# Patient Record
Sex: FEMALE | Race: BLACK OR AFRICAN AMERICAN | NOT HISPANIC OR LATINO | Employment: FULL TIME | URBAN - METROPOLITAN AREA
[De-identification: names, ages, dates, MRNs, and addresses within clinical notes are randomized per-mention and may not be internally consistent; named-entity substitution may affect disease eponyms.]

---

## 2022-03-09 ENCOUNTER — TELEPHONE (OUTPATIENT)
Dept: SLEEP MEDICINE | Facility: CLINIC | Age: 62
End: 2022-03-09
Payer: COMMERCIAL

## 2022-03-10 ENCOUNTER — OFFICE VISIT (OUTPATIENT)
Dept: SLEEP MEDICINE | Facility: CLINIC | Age: 62
End: 2022-03-10
Payer: COMMERCIAL

## 2022-03-10 VITALS
BODY MASS INDEX: 35.56 KG/M2 | SYSTOLIC BLOOD PRESSURE: 146 MMHG | DIASTOLIC BLOOD PRESSURE: 81 MMHG | HEART RATE: 58 BPM | WEIGHT: 227.06 LBS

## 2022-03-10 DIAGNOSIS — F51.09 OTHER INSOMNIA NOT DUE TO A SUBSTANCE OR KNOWN PHYSIOLOGICAL CONDITION: ICD-10-CM

## 2022-03-10 DIAGNOSIS — R35.1 NOCTURIA: ICD-10-CM

## 2022-03-10 DIAGNOSIS — G47.33 OSA (OBSTRUCTIVE SLEEP APNEA): Primary | ICD-10-CM

## 2022-03-10 DIAGNOSIS — R53.83 FATIGUE, UNSPECIFIED TYPE: ICD-10-CM

## 2022-03-10 PROCEDURE — 3079F PR MOST RECENT DIASTOLIC BLOOD PRESSURE 80-89 MM HG: ICD-10-PCS | Mod: CPTII,S$GLB,, | Performed by: INTERNAL MEDICINE

## 2022-03-10 PROCEDURE — 1159F PR MEDICATION LIST DOCUMENTED IN MEDICAL RECORD: ICD-10-PCS | Mod: CPTII,S$GLB,, | Performed by: INTERNAL MEDICINE

## 2022-03-10 PROCEDURE — 3079F DIAST BP 80-89 MM HG: CPT | Mod: CPTII,S$GLB,, | Performed by: INTERNAL MEDICINE

## 2022-03-10 PROCEDURE — 3077F PR MOST RECENT SYSTOLIC BLOOD PRESSURE >= 140 MM HG: ICD-10-PCS | Mod: CPTII,S$GLB,, | Performed by: INTERNAL MEDICINE

## 2022-03-10 PROCEDURE — 99204 PR OFFICE/OUTPT VISIT, NEW, LEVL IV, 45-59 MIN: ICD-10-PCS | Mod: S$GLB,,, | Performed by: INTERNAL MEDICINE

## 2022-03-10 PROCEDURE — 1159F MED LIST DOCD IN RCRD: CPT | Mod: CPTII,S$GLB,, | Performed by: INTERNAL MEDICINE

## 2022-03-10 PROCEDURE — 4010F PR ACE/ARB THEARPY RXD/TAKEN: ICD-10-PCS | Mod: CPTII,S$GLB,, | Performed by: INTERNAL MEDICINE

## 2022-03-10 PROCEDURE — 99999 PR PBB SHADOW E&M-EST. PATIENT-LVL III: ICD-10-PCS | Mod: PBBFAC,,, | Performed by: INTERNAL MEDICINE

## 2022-03-10 PROCEDURE — 3077F SYST BP >= 140 MM HG: CPT | Mod: CPTII,S$GLB,, | Performed by: INTERNAL MEDICINE

## 2022-03-10 PROCEDURE — 3008F PR BODY MASS INDEX (BMI) DOCUMENTED: ICD-10-PCS | Mod: CPTII,S$GLB,, | Performed by: INTERNAL MEDICINE

## 2022-03-10 PROCEDURE — 4010F ACE/ARB THERAPY RXD/TAKEN: CPT | Mod: CPTII,S$GLB,, | Performed by: INTERNAL MEDICINE

## 2022-03-10 PROCEDURE — 99204 OFFICE O/P NEW MOD 45 MIN: CPT | Mod: S$GLB,,, | Performed by: INTERNAL MEDICINE

## 2022-03-10 PROCEDURE — 99999 PR PBB SHADOW E&M-EST. PATIENT-LVL III: CPT | Mod: PBBFAC,,, | Performed by: INTERNAL MEDICINE

## 2022-03-10 PROCEDURE — 3008F BODY MASS INDEX DOCD: CPT | Mod: CPTII,S$GLB,, | Performed by: INTERNAL MEDICINE

## 2022-03-10 RX ORDER — LOSARTAN POTASSIUM 100 MG/1
100 TABLET ORAL DAILY
COMMUNITY

## 2022-03-10 RX ORDER — FERROUS SULFATE, DRIED 160(50) MG
1 TABLET, EXTENDED RELEASE ORAL 2 TIMES DAILY WITH MEALS
COMMUNITY

## 2022-03-10 NOTE — PROGRESS NOTES
Referred by Medicine, [facility]*     NEW PATIENT VISIT    Meme Flores  is a pleasant 61 y.o. female  with PMH significant for severe FANNY (dx circa 2019, did not receive CPAP, had wt loss surgery), elevated BMI (330#), s/p gastric bypass (#210) who presents today for repeat evaluation after weight loss.      SLEEP SCHEDULE   Bed Time varies   Sleep Latency variable   Arousals 3-4   Nocturia 3-4   Back to sleep 1+ hours   Wake time 6AM   Naps none   Work Retired, , works at a rehab center     No flowsheet data found.  No flowsheet data found.    Vitals:    03/10/22 0906   BP: (!) 146/81   BP Location: Right arm   Patient Position: Sitting   BP Method: Medium (Automatic)   Pulse: (!) 58   Weight: 103 kg (227 lb 1.2 oz)     Physical Exam:    GEN:   Well-appearing  Psych:  Appropriate affect, demonstrates insight  SKIN:  No rash on the face or bridge of the nose    LABS:   No results found for: HGB, CO2    RECORDS REVIEWED PREVIOUSLY:    Prior sleep studies are unavailable.    ASSESSMENT    No flowsheet data found.  PROBLEM DESCRIPTION/ Sx on Presentation  STATUS   Hx severe FANNY   Treated with bariatric surgery (2019)  #100 wt loss  + snoring, + snoring arousals  HEENT: MP4, + oropharynx shallow in A-P dimension and + crowded airway  New   Daytime Sx   occasional sleepiness when inactive   denies sleepiness when driving   ESS 7/24 on intake  New   Nocturia   3-4 times per night  New   [place]   Waking frequently  Sometimes hard to get back to sleep  Feels she is a much lighter sleeper than previously  New   Other issues:     PLAN     -will proceed with sleep testing (HST to reassess FANNY after wt loss)  -discussed trial of PAP therapy if FANNY present   -driving precautions were discussed with the patient    RTC          The patient was given open opportunity to ask questions and/or express concerns about treatment plan.   All questions/concerns were discussed.     Two patient identifiers used  prior to evaluation.

## 2022-03-11 ENCOUNTER — TELEPHONE (OUTPATIENT)
Dept: SLEEP MEDICINE | Facility: OTHER | Age: 62
End: 2022-03-11
Payer: COMMERCIAL

## 2022-03-15 ENCOUNTER — TELEPHONE (OUTPATIENT)
Dept: SLEEP MEDICINE | Facility: OTHER | Age: 62
End: 2022-03-15
Payer: COMMERCIAL

## 2022-03-16 ENCOUNTER — HOSPITAL ENCOUNTER (OUTPATIENT)
Dept: SLEEP MEDICINE | Facility: OTHER | Age: 62
Discharge: HOME | End: 2022-03-16
Attending: INTERNAL MEDICINE
Payer: COMMERCIAL

## 2022-03-16 DIAGNOSIS — G47.33 OSA (OBSTRUCTIVE SLEEP APNEA): ICD-10-CM

## 2022-03-16 DIAGNOSIS — R53.83 FATIGUE, UNSPECIFIED TYPE: ICD-10-CM

## 2022-03-16 DIAGNOSIS — R35.1 NOCTURIA: ICD-10-CM

## 2022-03-16 DIAGNOSIS — F51.09 OTHER INSOMNIA NOT DUE TO A SUBSTANCE OR KNOWN PHYSIOLOGICAL CONDITION: ICD-10-CM

## 2022-03-16 PROCEDURE — 95800 SLP STDY UNATTENDED: CPT

## 2022-03-16 PROCEDURE — 95806 PR SLEEP STUDY, UNATTENDED, SIMUL RECORD HR/O2 SAT/RESP FLOW/RESP EFFT: ICD-10-PCS | Mod: 26,,, | Performed by: INTERNAL MEDICINE

## 2022-03-16 PROCEDURE — 95806 SLEEP STUDY UNATT&RESP EFFT: CPT | Mod: 26,,, | Performed by: INTERNAL MEDICINE

## 2022-03-25 ENCOUNTER — TELEPHONE (OUTPATIENT)
Dept: SLEEP MEDICINE | Facility: CLINIC | Age: 62
End: 2022-03-25
Payer: COMMERCIAL

## 2022-03-25 NOTE — TELEPHONE ENCOUNTER
----- Message from Caitlyn Hensley MA sent at 3/25/2022 11:17 AM CDT -----  Regarding: pt requesting a call back  Name of Who is Calling: OLIVIA GRIDER [[id number]]           What is the request in detail: pt requesting a call back in regards to sleep study results            Can the clinic reply by [facility]: n           [facility] [phone]

## 2022-04-21 ENCOUNTER — TELEPHONE (OUTPATIENT)
Dept: SLEEP MEDICINE | Facility: CLINIC | Age: 62
End: 2022-04-21
Payer: COMMERCIAL

## 2022-04-21 NOTE — TELEPHONE ENCOUNTER
Staff left a message informing the pt to call the office back about her results of the sleep study

## 2022-08-13 ENCOUNTER — NURSE TRIAGE (OUTPATIENT)
Dept: ADMINISTRATIVE | Facility: CLINIC | Age: 62
End: 2022-08-13
Payer: COMMERCIAL

## 2022-08-13 NOTE — TELEPHONE ENCOUNTER
Patient called message left on , time two.     Reason for Disposition   Message left on identified voice mail    Protocols used: NO CONTACT OR DUPLICATE CONTACT CALL-A-AH

## 2025-03-27 ENCOUNTER — OFFICE VISIT (OUTPATIENT)
Dept: OTOLARYNGOLOGY | Facility: CLINIC | Age: 65
End: 2025-03-27
Payer: COMMERCIAL

## 2025-03-27 DIAGNOSIS — R49.0 DYSPHONIA: ICD-10-CM

## 2025-03-27 DIAGNOSIS — J31.0 CHRONIC RHINITIS: Primary | ICD-10-CM

## 2025-03-27 PROCEDURE — 99999 PR PBB SHADOW E&M-EST. PATIENT-LVL III: CPT | Mod: PBBFAC,,, | Performed by: OTOLARYNGOLOGY

## 2025-03-27 RX ORDER — CETIRIZINE HYDROCHLORIDE 10 MG/1
10 TABLET ORAL DAILY
Qty: 30 TABLET | Refills: 2 | Status: SHIPPED | OUTPATIENT
Start: 2025-03-27 | End: 2025-04-26

## 2025-03-27 RX ORDER — FLUTICASONE PROPIONATE 50 MCG
2 SPRAY, SUSPENSION (ML) NASAL DAILY
Qty: 16 G | Refills: 2 | Status: SHIPPED | OUTPATIENT
Start: 2025-03-27 | End: 2025-06-25

## 2025-03-27 NOTE — PROGRESS NOTES
Ear, Nose, & Throat  Otolaryngology - Head & Neck Surgery    Summary of Visit:  Meme Flores was  seen in ENT Clinic  for hoarseness    Subjective:     Chief Complaint:   Chief Complaint   Patient presents with    Hoarse       Meme Flores is a 64 y.o. female who was  seen in ENT Clinic  for dysphonia.  For the last several weeks, she has experienced dysphonia which varies throughout the day.  She notes that it typically worsens as the day proceeds.  She does report significant allergic rhinitis symptoms with some nasal congestion and sensation of postnasal drainage.  She has been on fluticasone with some improvement in these symptoms.  She denies any dysphagia, chronic cough, aspiration symptoms.    Past Medical History  Active Ambulatory Problems     Diagnosis Date Noted    FANNY (obstructive sleep apnea)      Resolved Ambulatory Problems     Diagnosis Date Noted    No Resolved Ambulatory Problems     Past Medical History:   Diagnosis Date    Hypertension        Past Surgical History  She has a past surgical history that includes Hysterectomy.    Past Surgical History:   Procedure Laterality Date    HYSTERECTOMY          Family History  Her family history is not on file.    Social History  She reports that she has never smoked. She has never used smokeless tobacco. She reports that she does not drink alcohol and does not use drugs.    Allergies  She has no known allergies.    Medications  She has a current medication list which includes the following prescription(s): aspirin, calcium-vitamin d3, cetirizine, clonazepam, fluticasone propionate, lisinopril, losartan, multivitamin, and rosuvastatin.    ROS:  Pertinent positive and negative review of systems as noted in HPI.     Objective:     There were no vitals taken for this visit.   General Appearance:   Awake, Alert and Oriented. NAD. Appropriate affect and appearance      Neuro:   Spontaneous eye opening, appropriate verbal responses, follows  commands  Pupils equal, round & brisk. EOMI, no proptosis  Face is symmetric, HB I, non-edematous bilaterally  Vision grossly intact, Hearing grossly intact     Head and Face:   skin is intact with no lesions noted.  Parotid and submandibular glands are symmetric and non-tender.      Ears:  Periauricular regions non-erythematous, non-fluctuanct non-tender  Pinna normal bilaterally, no skin lesions  EACs patent and without drainage bilaterally   Tympanic membranes are normal in appearance bilaterally.  No middle ear effusion noted bilaterally.    Nose:   External nose is symmetric, no skin lesions  Septum midline, No inferior turbinate hypertrophy, No polyps or rhinorrhea     OC/OP:  Tongue midline on extension, non-edematous, soft  No labial, buccal, oral tongue or floor of mouth lesions  Soft palate symmetric, midline and without lesions or masses, tonsils symmetric  No masses or lesions of the visualized oropharynx     Neck:  Neck is symmetric, non-edematous, non-erythematous  Trachea is midline and easily palpable,  No palpable adenopathy or masses in levels I-VI  No thyroid nodules or masses, non-tender      Respiratory:  Normal work of breathing, no accessory muscle use, no stridor     Voice:  Normal vocal quality, volume and articulation    Data Review:   LABS    IMAGING        AUDIO      Procedures:     Procedure: Flexible Laryngoscopy    Date: 04/11/2025     Pre procedure Diagnosis: Dysphonia    Post procedure Diagnosis: same    Equipment Used: Distal Chip Video Laryngoscope    Anesthesia: Topical 4% Lidocaine and Konstantin-synephrine    Nasal Side: right    Procedure:  After verbal consent was obtained, the nose was sprayed with topical anesthetic and decongestant. A flexible laryngoscope was inserted through the nose to the level of the larynx. Findings listed and photodocumentation listed below    Nasopharynx: Non-obstructive, symmetric adenoid tissue. Eustachian tubes orifices patent. Symmetric soft palate  elevation.  Thick clear secretions are noted throughout  Oropharynx: Symmetric base of tongue, No concerning lesions or mass of valleculae, posterior pharynx, base of tongue or tonsils  Hypopharynx: No concerning lesions/masses of pyriform sinuses. No pooling secretions.   Epiglottis: Crisp  Larynx: True vocal folds mobile bilaterally, no masses or lesions of the endolarynx, no erythema, subglottis adequately visualized. No signs of stenosis or masses.     Outcome: The patient tolerated the procedure well and without complaint.     Assessment:     Dysphonia  Chronic rhinitis    Plan:     I had a long discussion with the patient regarding her condition and the further workup and management options.  Findings are consistent with chronic rhinitis on laryngoscopy.  This may well account for her dysphonia as well.  We will add oral antihistamine and have her restart her Flonase.  Follow up in 3-4 weeks to assess her response    No orders of the defined types were placed in this encounter.     Medications Ordered This Encounter   Medications    cetirizine (ZYRTEC) 10 MG tablet    fluticasone propionate (FLONASE) 50 mcg/actuation nasal spray      Problem List Items Addressed This Visit    None

## 2025-08-28 DIAGNOSIS — J31.0 CHRONIC RHINITIS: Primary | ICD-10-CM

## 2025-08-29 RX ORDER — FLUTICASONE PROPIONATE 50 MCG
2 SPRAY, SUSPENSION (ML) NASAL DAILY
Qty: 15.8 ML | Refills: 11 | Status: SHIPPED | OUTPATIENT
Start: 2025-08-29